# Patient Record
(demographics unavailable — no encounter records)

---

## 2024-12-20 NOTE — IMAGING
[de-identified] : LEFT KNEE ROM 5-100 medial and lateral joint line tenderness crepitus mod effusion 5/5 strength +1 edema +2 pt pulses sensation intact  RIGHT KNEE ROM 5-105 medial and lateral joint line tenderness crepitus mod effusion  XR B/L KNEES showing adv OA (L>R)

## 2024-12-20 NOTE — DISCUSSION/SUMMARY
[de-identified] : The patient was advised of the diagnosis.  The natural history of the pathology was explained in full to the patient in layman's terms. All questions were answered.  The risks and benefits of surgical and non-surgical treatment alternatives were explained in full to the patient.  I saw the patient under the supervision of Dr. Garcia and followed his plan of care.
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
## 2024-12-20 NOTE — HISTORY OF PRESENT ILLNESS
[de-identified] : 12/20/24: Mult falls since last visit, now using walker.  Going to PT recently.  Living in shelter currently due to asbestos at home.  Had to cancel TKA due to mutl falls.  Previous doc:  2/19/24: 53F with b/l knee pain (L>R) x 1.5 years months since slipping and falling onto both knees. Saw her PCP- sent her for PT- has been doing it for a year now- feels it is beneficial but relief is temporary. Reports the LT knee locks and melody frequently. Had b/l csi at [facility] 3 months ago with some mild relief- told she has bone on bone OA. Taking aleeve as needed with some relief. Pain occ wakes her up at night. Difficulty with stairs, getting in and out of car. Ambulates without assistance.  6/17/24: Here to f/up b/l knees. States she had b/l knee csi 3 months ago- reports sig relief for about 3 months- pain has returned. Reports arthroscopic gallbladder and stone removal on 4/25- states surgery went well. Now is back in PT- unable to do more rigorous exercises due to her stomach stitches. Ambulates without assistance. State she lost weight but unsure how much- does not have a scale at home. 9/6/24: has been loosing weight - less than 250Lbs she states - severe pain and affecting her daily

## 2025-01-14 NOTE — HISTORY OF PRESENT ILLNESS
[de-identified] : Ms. KYRIE BARNARD is a 54 year old female presenting for evaluation of left knee pain since September 2022. Patient fell on her knee in a 7/11 after tripping on a mat on the floor. Patient notes pain is worse with all weightbearing activity as well as deep flexion. She has worsening stiffness and crepitus of the knee. Patient had a steroid injection 6 months ago and reports good relief from this. She has not had gel. She has tried PT and NSAIDs without improvement.

## 2025-01-14 NOTE — PHYSICAL EXAM
[de-identified] :  The patient appears well nourished and in no apparent distress. The patient is alert and oriented to person, place, and time. Affect and mood appear normal. The head is normocephalic and atraumatic. The eyes reveal normal sclera and extra ocular muscles are intact. The tongue is midline with no apparent lesions. Skin shows normal turgor with no evidence of eczema or psoriasis. No respiratory distress noted. Sensation grossly intact. [de-identified] :  Exam of the left knee shows -10 to 90 degrees of flexion measured with a goniometer. 5/5 motor strength bilaterally distally. Sensation intact distally. [de-identified] : X-ray: 4 views of the left knee demonstrate bone on bone arthritis

## 2025-01-14 NOTE — DISCUSSION/SUMMARY
[de-identified] :  KYRIE BARNARD is a 54 year old female who presents with left knee bone on bone arthritis.  Patient has numerous perioperative risk factors including obesity and smoking.  At this time she also has a number of social risk factors that place her at high risk for postoperative complication for recovery after knee replacement.  The patient has been followed over time by Dr. Garcia and recent note from September indicates that she was being indicated for surgery.  Therefore, I encouraged her to continue to follow-up with Dr. Garcia to schedule surgery when they deem it is appropriate.

## 2025-01-14 NOTE — CONSULT LETTER
[Dear  ___] : Dear  [unfilled], [Consult Letter:] : I had the pleasure of evaluating your patient, [unfilled]. [Please see my note below.] : Please see my note below. [Consult Closing:] : Thank you very much for allowing me to participate in the care of this patient.  If you have any questions, please do not hesitate to contact me. [Sincerely,] : Sincerely, [FreeTextEntry2] : DENISE MCKEON MD [FreeTextEntry3] : Clyde Payne MD Chief of Joint Replacement Primary & Revision Hip and Knee Replacement  [facility] [place]

## 2025-01-17 NOTE — IMAGING
[de-identified] : LEFT KNEE ROM 5-100 medial and lateral joint line tenderness crepitus mod effusion 5/5 strength +1 edema +2 pt pulses sensation intact  RIGHT KNEE ROM 5-105 medial and lateral joint line tenderness crepitus mod effusion  XR B/L KNEES showing adv OA (L>R)

## 2025-01-17 NOTE — HISTORY OF PRESENT ILLNESS
[de-identified] : 1/17/25: having worsening rebecca knee pain  Previous doc:  2/19/24: 53F with b/l knee pain (L>R) x 1.5 years months since slipping and falling onto both knees. Saw her PCP- sent her for PT- has been doing it for a year now- feels it is beneficial but relief is temporary. Reports the LT knee locks and melody frequently. Had b/l csi at [facility] 3 months ago with some mild relief- told she has bone on bone OA. Taking aleeve as needed with some relief. Pain occ wakes her up at night. Difficulty with stairs, getting in and out of car. Ambulates without assistance.  6/17/24: Here to f/up b/l knees. States she had b/l knee csi 3 months ago- reports sig relief for about 3 months- pain has returned. Reports arthroscopic gallbladder and stone removal on 4/25- states surgery went well. Now is back in PT- unable to do more rigorous exercises due to her stomach stitches. Ambulates without assistance. State she lost weight but unsure how much- does not have a scale at home. 9/6/24: has been loosing weight - less than 250Lbs she states - severe pain and affecting her daily   12/20/24: Mult falls since last visit, now using walker.  Going to PT recently.  Living in shelter currently due to asbestos at home.  Had to cancel TKA due to mutl falls. [FreeTextEntry5] : consistent with losing weight.
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
## 2025-01-17 NOTE — DISCUSSION/SUMMARY
[de-identified] : The patient was advised of the diagnosis.  The natural history of the pathology was explained in full to the patient in layman's terms. All questions were answered.  The risks and benefits of surgical and non-surgical treatment alternatives were explained in full to the patient.  The natural progression of Osteoarthritis was explained to the patient. We discussed the possible treatment options from conservative to operative. These included NSAIDS, Glucosamine and Chondroitin sulfate, and Physical Therapy as well different types of injections. We also discussed that at some point they may progress to needing a TKA.  Information and pamphlets were given when appropriate.   Patient Complains of pain in Knee with a level that often reaches greater than an 8/10. The Pain has been progressively worsening of his/her treatment course. The pain has interfered with their ADLs and worsens with weight bearing. On exam they often have episodes of swelling/effusion with limited ROM. Pain worsens with ROM passive and active and I can palpate crepitus.   X-rays were reviewed with the patient, and they show joint space narrowing, subchondral sclerosis, osteophyte formation, and subchondral cysts.   After a period of more than 12 weeks physical therapy or exercise program done with me or another treating physician, they have continued pain. The patient has failed a trial of NSAID medication or pain relievers if they were unable to tolerate NSAID medications as well as a series of injection, steroid or Hyaluronic Acid. After a long discussion with the patient both the patient and I have decided we have exhausted all forms of less radical treatments, and they would like to proceed with Total Knee Replacement   We discussed my findings and the natural history of their condition. We talked about the details of the proposed surgery and the recovery. We discussed the material risks, possible benefits and alternatives to surgery. The risks include but are not limited to infection, bleeding and possible need for blood transfusion, fracture, bowel blockage, bladder retention or infection, need for reoperation, stiffness and/or limited range of motion, possible damage to nerves and blood vessels, failure of fixation of components, risk of deep vein thromboses and pulmonary embolism, wound healing problems, dislocation, and possible leg length discrepancy. Although incredibly rare, we also discussed the risks of a cardiac event, stroke and even death during, or following, the surgery. We discussed the type of implants the patient will be receiving and the type of fixation that will be used, as well as whether a robot or computer navigation aide will be used. The patient understands they will need medical clearance and will attend a preoperative joint education class. We also discussed the type of anesthesia they will receive, and the risks associated with hospital or rehab length of stay, obesity, diabetes and smoking.  Progress Note completed by Angella Aviles PA-C * Dr. Garcia -- The documentation recorded in this note accurately reflects the decisions made by me during this visit.  I interviewed and examined the patient personally and oversaw all medical decisions.

## 2025-02-24 NOTE — HISTORY OF PRESENT ILLNESS
[FreeTextEntry1] : 54F h/o obesity (BMI 35), former chronic smoker, recurrent diverticulitis, L-knee injury few years ago now planning for elective knee replacement surgery 25 at [facility] preop EKG interpreted as abnormal presents for preoperative cardiac risk evaluation.   Walking daily with rolling walker for 20 minutes, denies chest pain or shortness of breath. Had hysterectomy 6 years ago, denies anesthesia side effects. Following with PCP for annual physical denies being told with abnormal EKG in the past. No prior sleep study. Had prior EKG in 10/2024 at [facility] also interpreted as abnormal.  Recent lab A1c 5.0% and preop lab work from 25 within normal.   Grandmother with CAD/stent  age 110 Chronic smoker for 10yrs quit few months ago No alcohol use On disability for 1 year, used to drive ride-hailing

## 2025-02-24 NOTE — PHYSICAL EXAM
[Well Developed] : well developed [Well Nourished] : well nourished [No Acute Distress] : no acute distress [Obese] : obese [Normal Conjunctiva] : normal conjunctiva [Normal Venous Pressure] : normal venous pressure [No Carotid Bruit] : no carotid bruit [Normal S1, S2] : normal S1, S2 [No Murmur] : no murmur [No Rub] : no rub [No Gallop] : no gallop [Clear Lung Fields] : clear lung fields [Good Air Entry] : good air entry [No Respiratory Distress] : no respiratory distress  [Soft] : abdomen soft [Non Tender] : non-tender [No Masses/organomegaly] : no masses/organomegaly [Normal Bowel Sounds] : normal bowel sounds [Abnormal Gait] : abnormal gait [No Edema] : no edema [No Cyanosis] : no cyanosis [No Clubbing] : no clubbing [No Varicosities] : no varicosities [No Rash] : no rash [No Skin Lesions] : no skin lesions [Moves all extremities] : moves all extremities [No Focal Deficits] : no focal deficits [Normal Speech] : normal speech [Alert and Oriented] : alert and oriented [Normal memory] : normal memory [de-identified] : using gage

## 2025-02-24 NOTE — ADDENDUM
[FreeTextEntry1] : Echocardiogram done with normal biventricular systolic function, LV EF 69% and no significant valvular abnormality, moderate LVH noted but normotensive.  -no cardiac contraindication and currently optimized for the elective knee surgery under anesthesia   Given former chronic smoker advised to follow up in the cardiology office after knee surgery for long term cardiac risk factors screening.

## 2025-02-24 NOTE — HISTORY OF PRESENT ILLNESS
[FreeTextEntry1] : 54F h/o obesity (BMI 35), former chronic smoker, recurrent diverticulitis, L-knee injury few years ago now planning for elective knee replacement surgery 25 at [facility] preop EKG interpreted as abnormal presents for preoperative cardiac risk evaluation.   Walking daily with rolling walker for 20 minutes, denies chest pain or shortness of breath. Had hysterectomy 6 years ago, denies anesthesia side effects. Following with PCP for annual physical denies being told with abnormal EKG in the past. No prior sleep study. Had prior EKG in 10/2024 at [facility] also interpreted as abnormal.  Recent lab A1c 5.0% and preop lab work from 25 within normal.   Grandmother with CAD/stent  age 110 Chronic smoker for 10yrs quit few months ago No alcohol use On disability for 1 year, used to drive ride-hailing

## 2025-02-24 NOTE — CARDIOLOGY SUMMARY
[de-identified] : 2/19/25- sinus 69, normal axis, Q-wave III/aVF, nonspecific T-wave V1-V2, QTc 431

## 2025-02-24 NOTE — CARDIOLOGY SUMMARY
[de-identified] : 2/19/25- sinus 69, normal axis, Q-wave III/aVF, nonspecific T-wave V1-V2, QTc 431

## 2025-02-24 NOTE — DISCUSSION/SUMMARY
[EKG obtained to assist in diagnosis and management of assessed problem(s)] : EKG obtained to assist in diagnosis and management of assessed problem(s) [FreeTextEntry1] : 54F h/o obesity (BMI 35), former chronic smoker, recurrent diverticulitis, L-knee injury few years ago now planning for elective knee replacement surgery 2/27/25 at [facility] preop EKG interpreted as abnormal presents for preoperative cardiac risk evaluation.   Serial prior EKGs with interpretation of infarct pattern likely not accurate, she has no cardiac complains, will obtain Echocardiogram to assess structural heart function if within normal then no cardiac contraindication.    Preop. cardiac risk eval.  -await TTE if normal then no cardiac contraindication for the elective knee replacement surgery  -baseline MET >4 and revised cardiac risk index of 0   Obesity -need dieting for weight loss -needs record of recent A1c and lipid panel -with risk factors of undiagnosed ROCK   Originally signed 2/19/25

## 2025-02-24 NOTE — CARDIOLOGY SUMMARY
[de-identified] : 2/19/25- sinus 69, normal axis, Q-wave III/aVF, nonspecific T-wave V1-V2, QTc 431

## 2025-02-24 NOTE — DISCUSSION/SUMMARY
[EKG obtained to assist in diagnosis and management of assessed problem(s)] : EKG obtained to assist in diagnosis and management of assessed problem(s) [FreeTextEntry1] : 54F h/o obesity (BMI 35), former chronic smoker, recurrent diverticulitis, L-knee injury few years ago now planning for elective knee replacement surgery 2/27/25 at [facility] preop EKG interpreted as abnormal presents for preoperative cardiac risk evaluation.   Serial prior EKGs with interpretation of infarct pattern likely not accurate, she has no cardiac complains, will obtain Echocardiogram to assess structural heart function if within normal then no cardiac contraindication.    Preop. cardiac risk eval.  -await TTE if normal then no cardiac contraindication for the elective knee replacement surgery  -baseline MET >4 and revised cardiac risk index of 0   Obesity -need dieting for weight loss -needs record of recent A1c and lipid panel -with risk factors of undiagnosed ROCK   Originally signed 2/19/25

## 2025-02-24 NOTE — PHYSICAL EXAM
[Well Developed] : well developed [Well Nourished] : well nourished [No Acute Distress] : no acute distress [Obese] : obese [Normal Conjunctiva] : normal conjunctiva [Normal Venous Pressure] : normal venous pressure [No Carotid Bruit] : no carotid bruit [Normal S1, S2] : normal S1, S2 [No Murmur] : no murmur [No Rub] : no rub [No Gallop] : no gallop [Clear Lung Fields] : clear lung fields [Good Air Entry] : good air entry [No Respiratory Distress] : no respiratory distress  [Soft] : abdomen soft [Non Tender] : non-tender [No Masses/organomegaly] : no masses/organomegaly [Normal Bowel Sounds] : normal bowel sounds [Abnormal Gait] : abnormal gait [No Edema] : no edema [No Cyanosis] : no cyanosis [No Clubbing] : no clubbing [No Varicosities] : no varicosities [No Rash] : no rash [No Skin Lesions] : no skin lesions [Moves all extremities] : moves all extremities [No Focal Deficits] : no focal deficits [Normal Speech] : normal speech [Alert and Oriented] : alert and oriented [Normal memory] : normal memory [de-identified] : using gage

## 2025-02-24 NOTE — PHYSICAL EXAM
[Well Developed] : well developed [Well Nourished] : well nourished [No Acute Distress] : no acute distress [Obese] : obese [Normal Conjunctiva] : normal conjunctiva [Normal Venous Pressure] : normal venous pressure [No Carotid Bruit] : no carotid bruit [Normal S1, S2] : normal S1, S2 [No Murmur] : no murmur [No Rub] : no rub [No Gallop] : no gallop [Clear Lung Fields] : clear lung fields [Good Air Entry] : good air entry [No Respiratory Distress] : no respiratory distress  [Soft] : abdomen soft [Non Tender] : non-tender [No Masses/organomegaly] : no masses/organomegaly [Normal Bowel Sounds] : normal bowel sounds [Abnormal Gait] : abnormal gait [No Edema] : no edema [No Cyanosis] : no cyanosis [No Clubbing] : no clubbing [No Varicosities] : no varicosities [No Rash] : no rash [No Skin Lesions] : no skin lesions [Moves all extremities] : moves all extremities [No Focal Deficits] : no focal deficits [Normal Speech] : normal speech [Alert and Oriented] : alert and oriented [Normal memory] : normal memory [de-identified] : using gage

## 2025-02-24 NOTE — DISCUSSION/SUMMARY
[EKG obtained to assist in diagnosis and management of assessed problem(s)] : EKG obtained to assist in diagnosis and management of assessed problem(s) [FreeTextEntry1] : 54F h/o obesity (BMI 35), former chronic smoker, recurrent diverticulitis, L-knee injury few years ago now planning for elective knee replacement surgery 2/27/25 at [facility] preop EKG interpreted as abnormal presents for preoperative cardiac risk evaluation.   Serial prior EKGs with interpretation of infarct pattern likely not accurate, she has no cardiac complains, will obtain Echocardiogram to assess structural heart function if within normal then no cardiac contraindication.    Preop. cardiac risk eval.  -await TTE if normal then no cardiac contraindication for the elective knee replacement surgery  -baseline MET >4 and revised cardiac risk index of 0   Obesity -need dieting for weight loss -needs record of recent A1c and lipid panel -with risk factors of undiagnosed ROCK   Originally signed 2/19/25

## 2025-02-24 NOTE — CARDIOLOGY SUMMARY
[de-identified] : 2/19/25- sinus 69, normal axis, Q-wave III/aVF, nonspecific T-wave V1-V2, QTc 431

## 2025-02-24 NOTE — PHYSICAL EXAM
[Well Developed] : well developed [Well Nourished] : well nourished [No Acute Distress] : no acute distress [Obese] : obese [Normal Conjunctiva] : normal conjunctiva [Normal Venous Pressure] : normal venous pressure [No Carotid Bruit] : no carotid bruit [Normal S1, S2] : normal S1, S2 [No Murmur] : no murmur [No Rub] : no rub [No Gallop] : no gallop [Clear Lung Fields] : clear lung fields [Good Air Entry] : good air entry [No Respiratory Distress] : no respiratory distress  [Soft] : abdomen soft [Non Tender] : non-tender [No Masses/organomegaly] : no masses/organomegaly [Normal Bowel Sounds] : normal bowel sounds [Abnormal Gait] : abnormal gait [No Edema] : no edema [No Cyanosis] : no cyanosis [No Clubbing] : no clubbing [No Varicosities] : no varicosities [No Rash] : no rash [No Skin Lesions] : no skin lesions [Moves all extremities] : moves all extremities [No Focal Deficits] : no focal deficits [Normal Speech] : normal speech [Alert and Oriented] : alert and oriented [Normal memory] : normal memory [de-identified] : using gage

## 2025-02-24 NOTE — HISTORY OF PRESENT ILLNESS
[FreeTextEntry1] : 54F h/o obesity (BMI 35), former chronic smoker, recurrent diverticulitis, L-knee injury few years ago now planning for elective knee replacement surgery 25 at [facility] preop EKG interpreted as abnormal presents for preoperative cardiac risk evaluation.   Walking daily with rolling walker for 20 minutes, denies chest pain or shortness of breath. Had hysterectomy 6 years ago, denies anesthesia side effects. Following with PCP for annual physical denies being told with abnormal EKG in the past. No prior sleep study. Had prior EKG in 10/2024 at [facility] also interpreted as abnormal.  Recent lab A1c 5.0% and preop lab work from 25 within normal.   Grandmother with CAD/stent  age 110 Chronic smoker for 10yrs quit few months ago No alcohol use On disability for 1 year, used to drive ride-hailing

## 2025-02-24 NOTE — DISCUSSION/SUMMARY
[EKG obtained to assist in diagnosis and management of assessed problem(s)] : EKG obtained to assist in diagnosis and management of assessed problem(s) [FreeTextEntry1] : 54F h/o obesity (BMI 35), former chronic smoker, recurrent diverticulitis, L-knee injury few years ago now planning for elective knee replacement surgery 2/27/25 at [facility] preop EKG interpreted as abnormal presents for preoperative cardiac risk evaluation.   Serial prior EKGs with interpretation of infarct pattern likely not accurate, she has no cardiac complains, will obtain Echocardiogram to assess structural heart function if within normal then no cardiac contraindication.    Preop. cardiac risk eval.  -await TTE if normal then no cardiac contraindication for the elective knee replacement surgery  -baseline MET >4 and revised cardiac risk index of 0   Obesity -need dieting for weight loss -needs record of recent A1c and lipid panel -with risk factors of undiagnosed ROCK   Originally signed 2/19/25

## 2025-02-24 NOTE — HISTORY OF PRESENT ILLNESS
[FreeTextEntry1] : 54F h/o obesity (BMI 35), former chronic smoker, recurrent diverticulitis, L-knee injury few years ago now planning for elective knee replacement surgery 25 at [facility] preop EKG interpreted as abnormal presents for preoperative cardiac risk evaluation.   Walking daily with rolling walker for 20 minutes, denies chest pain or shortness of breath. Had hysterectomy 6 years ago, denies anesthesia side effects. Following with PCP for annual physical denies being told with abnormal EKG in the past. No prior sleep study. Had prior EKG in 10/2024 at [facility] also interpreted as abnormal.  Recent lab A1c 5.0% and preop lab work from 25 within normal.   Grandmother with CAD/stent  age 110 Chronic smoker for 10yrs quit few months ago No alcohol use On disability for 1 year, used to drive ride-hailing